# Patient Record
Sex: FEMALE | Race: WHITE | NOT HISPANIC OR LATINO | Employment: OTHER | URBAN - METROPOLITAN AREA
[De-identification: names, ages, dates, MRNs, and addresses within clinical notes are randomized per-mention and may not be internally consistent; named-entity substitution may affect disease eponyms.]

---

## 2017-03-01 ENCOUNTER — HOSPITAL ENCOUNTER (OUTPATIENT)
Dept: MAMMOGRAPHY | Facility: HOSPITAL | Age: 67
Discharge: HOME | End: 2017-03-01
Attending: OBSTETRICS & GYNECOLOGY | Admitting: OBSTETRICS & GYNECOLOGY

## 2017-03-01 ENCOUNTER — APPOINTMENT (OUTPATIENT)
Dept: BONE DENSITY | Facility: HOSPITAL | Age: 67
End: 2017-03-01
Attending: OBSTETRICS & GYNECOLOGY

## 2017-03-01 DIAGNOSIS — Z12.31 ENCOUNTER FOR MAMMOGRAM TO ESTABLISH BASELINE MAMMOGRAM: ICD-10-CM

## 2017-03-01 DIAGNOSIS — Z13.9 SCREENING: ICD-10-CM

## 2017-03-01 DIAGNOSIS — M81.0 OSTEOPOROSIS: ICD-10-CM

## 2017-03-01 PROCEDURE — G0202 SCR MAMMO BI INCL CAD: HCPCS

## 2017-03-01 PROCEDURE — 77063 BREAST TOMOSYNTHESIS BI: CPT

## 2017-03-01 PROCEDURE — 77080 DXA BONE DENSITY AXIAL: CPT

## 2017-06-15 ENCOUNTER — TELEPHONE (OUTPATIENT)
Dept: OBSTETRICS AND GYNECOLOGY | Facility: CLINIC | Age: 67
End: 2017-06-15

## 2017-06-15 NOTE — TELEPHONE ENCOUNTER
Patient called stating she needs a Rx for her Prolia, she last saw you in November 2016 it looks like.

## 2017-06-19 NOTE — TELEPHONE ENCOUNTER
I wanted her to take a break after her 4th dose of Prolia and we can recheck her DEXA in 2018 and moake sure she is stable. Is this Rx for her 4th or 5th does?

## 2018-03-09 ENCOUNTER — TRANSCRIBE ORDERS (OUTPATIENT)
Dept: ADMINISTRATIVE | Facility: HOSPITAL | Age: 68
End: 2018-03-09

## 2018-03-09 DIAGNOSIS — Z12.31 VISIT FOR SCREENING MAMMOGRAM: Primary | ICD-10-CM

## 2018-03-14 ENCOUNTER — APPOINTMENT (OUTPATIENT)
Dept: MAMMOGRAPHY | Facility: HOSPITAL | Age: 68
End: 2018-03-14
Attending: OBSTETRICS & GYNECOLOGY

## 2018-03-19 ENCOUNTER — HOSPITAL ENCOUNTER (OUTPATIENT)
Dept: MAMMOGRAPHY | Facility: HOSPITAL | Age: 68
Discharge: HOME | End: 2018-03-19
Attending: OBSTETRICS & GYNECOLOGY | Admitting: OBSTETRICS & GYNECOLOGY

## 2018-03-19 ENCOUNTER — OFFICE VISIT (OUTPATIENT)
Dept: OBSTETRICS AND GYNECOLOGY | Facility: CLINIC | Age: 68
End: 2018-03-19

## 2018-03-19 VITALS
BODY MASS INDEX: 28.04 KG/M2 | SYSTOLIC BLOOD PRESSURE: 110 MMHG | HEIGHT: 68 IN | WEIGHT: 185 LBS | DIASTOLIC BLOOD PRESSURE: 80 MMHG

## 2018-03-19 DIAGNOSIS — M81.8 OTHER OSTEOPOROSIS, UNSPECIFIED PATHOLOGICAL FRACTURE PRESENCE: ICD-10-CM

## 2018-03-19 DIAGNOSIS — Z01.411 ENCOUNTER FOR GYNECOLOGICAL EXAMINATION WITH ABNORMAL FINDING: ICD-10-CM

## 2018-03-19 DIAGNOSIS — N95.2 VAGINAL ATROPHY: ICD-10-CM

## 2018-03-19 DIAGNOSIS — Z13.9 SCREENING FOR CONDITION: Primary | ICD-10-CM

## 2018-03-19 DIAGNOSIS — Z12.31 VISIT FOR SCREENING MAMMOGRAM: ICD-10-CM

## 2018-03-19 DIAGNOSIS — Z01.419 PAP SMEAR, LOW-RISK: ICD-10-CM

## 2018-03-19 PROBLEM — K21.9 GERD (GASTROESOPHAGEAL REFLUX DISEASE): Status: ACTIVE | Noted: 2018-03-19

## 2018-03-19 PROBLEM — E78.00 HYPERCHOLESTEREMIA: Status: ACTIVE | Noted: 2018-03-19

## 2018-03-19 PROBLEM — E03.9 HYPOTHYROIDISM: Status: ACTIVE | Noted: 2018-03-19

## 2018-03-19 PROBLEM — M81.0 OSTEOPOROSIS: Status: ACTIVE | Noted: 2018-03-19

## 2018-03-19 LAB
BILIRUB BLD-MCNC: NEGATIVE MG/DL
CLARITY, POC: CLEAR
COLOR UR: YELLOW
GLUCOSE UR STRIP-MCNC: NEGATIVE MG/DL
KETONES UR QL: NEGATIVE
LEUKOCYTE EST, POC: NEGATIVE
NITRITE UR-MCNC: NEGATIVE MG/ML
PH UR: 6.5 [PH] (ref 5–8)
PROT UR STRIP-MCNC: NEGATIVE MG/DL
RBC # UR STRIP: NEGATIVE /UL
SP GR UR: 1.02 (ref 1–1.03)
UROBILINOGEN UR QL: NORMAL

## 2018-03-19 PROCEDURE — 81002 URINALYSIS NONAUTO W/O SCOPE: CPT | Performed by: OBSTETRICS & GYNECOLOGY

## 2018-03-19 PROCEDURE — 99213 OFFICE O/P EST LOW 20 MIN: CPT | Performed by: OBSTETRICS & GYNECOLOGY

## 2018-03-19 PROCEDURE — 99397 PER PM REEVAL EST PAT 65+ YR: CPT | Performed by: OBSTETRICS & GYNECOLOGY

## 2018-03-19 PROCEDURE — 77067 SCR MAMMO BI INCL CAD: CPT

## 2018-03-19 PROCEDURE — 77063 BREAST TOMOSYNTHESIS BI: CPT

## 2018-03-19 RX ORDER — ESTRADIOL 10 UG/1
1 INSERT VAGINAL 2 TIMES WEEKLY
Qty: 8 TABLET | Refills: 11 | Status: SHIPPED | OUTPATIENT
Start: 2018-03-19 | End: 2019-08-23

## 2018-03-19 NOTE — PROGRESS NOTES
GYN Annual Exam     CC- Here for annual exam.     Kerrie Polo is a 67 y.o. female established patient who presents for annual well woman exam. No  VB. She had a UTI and has noticed some vaginal dryness upon questioning.  She also had a presyncopal episode and went to the ER. Her w/u there was normal and she is seeing her primary this month. She had 4 doses of Prolia and her DEXA in 3/2017 showed osteopenia and she has been on a drug holiday. She is scheduled for a sleep study as well.       OB History      Para Term  AB Living    2 2 2   2    SAB TAB Ectopic Molar Multiple Live Births                 Obstetric Comments    2           Menarche:18  Menopause:44  HRT:none  Current contraception: post menopausal status  History of abnormal Pap smear: no  History of abnormal mammogram: no  Family history of uterine, colon or ovarian cancer: yes - dad colon cancer- ? dx age  Family history of breast cancer: yes - PGM ? age dx  STD's: none    Health Maintenance   Topic Date Due   • TDAP/TD VACCINES (1 - Tdap) 1970   • PNEUMOCOCCAL VACCINES (65+ LOW/MEDIUM RISK) (1 of 2 - PCV13) 2016   • HEPATITIS C SCREENING  06/15/2017   • MEDICARE ANNUAL WELLNESS  06/15/2017   • COLONOSCOPY  06/15/2017   • ZOSTER VACCINE  06/15/2017   • INFLUENZA VACCINE  2017   • LIPID PANEL  2018   • MAMMOGRAM  2019   • DXA SCAN  2019       Past Medical History:   Diagnosis Date   • GERD (gastroesophageal reflux disease) 3/19/2018   • Hypercholesteremia 3/19/2018   • Hypothyroidism 3/19/2018   • Osteoporosis 3/19/2018       Past Surgical History:   Procedure Laterality Date   • FOOT SURGERY           Current Outpatient Prescriptions:   •  estradiol (YUVAFEM) 10 MCG tablet vaginal tablet, Insert 1 tablet into the vagina 2 (Two) Times a Week., Disp: 8 tablet, Rfl: 11    No Known Allergies    Social History   Substance Use Topics   • Smoking status: Never Smoker   • Smokeless tobacco: Not on file  "  • Alcohol use No       Family History   Problem Relation Age of Onset   • Breast cancer Paternal Grandmother    • Colon cancer Father    • Ovarian cancer Neg Hx        Review of Systems   Constitutional: Negative for appetite change, fatigue, fever and unexpected weight change.   Respiratory: Negative for cough and shortness of breath.    Cardiovascular: Negative for chest pain and palpitations.   Gastrointestinal: Negative for abdominal distention, abdominal pain, constipation, diarrhea and nausea.   Endocrine: Negative for cold intolerance and heat intolerance.   Genitourinary: Positive for vaginal pain (dryness). Negative for dyspareunia, dysuria, genital sores, hematuria, menstrual problem, pelvic pain and vaginal discharge.   Musculoskeletal: Negative for arthralgias, back pain and myalgias.   Skin: Negative for color change and rash.   Neurological: Negative for headaches.   Psychiatric/Behavioral: Negative for dysphoric mood. The patient is not nervous/anxious.        /80   Ht 172 cm (67.72\")   Wt 83.9 kg (185 lb)   BMI 28.36 kg/m²     Physical Exam   Constitutional: She is oriented to person, place, and time. She appears well-developed and well-nourished.   HENT:   Head: Normocephalic and atraumatic.   Eyes: Conjunctivae are normal. No scleral icterus.   Neck: Neck supple. No thyromegaly present.   Cardiovascular: Normal rate, regular rhythm and normal heart sounds.    Pulmonary/Chest: Effort normal and breath sounds normal. Right breast exhibits no inverted nipple, no mass, no nipple discharge, no skin change and no tenderness. Left breast exhibits no inverted nipple, no mass, no nipple discharge, no skin change and no tenderness.   Abdominal: Soft. Bowel sounds are normal. She exhibits no distension and no mass. There is no tenderness. There is no rebound and no guarding. No hernia.   Genitourinary: Pelvic exam was performed with patient supine. There is no rash, tenderness or lesion on the " right labia. There is no rash, tenderness or lesion on the left labia. Uterus is not deviated, not enlarged, not fixed and not tender. Cervix exhibits no motion tenderness, no discharge and no friability. Right adnexum displays no mass, no tenderness and no fullness. Left adnexum displays no mass, no tenderness and no fullness. No erythema, tenderness or bleeding in the vagina. No foreign body in the vagina. No signs of injury around the vagina. No vaginal discharge found.   Genitourinary Comments: Moderate atrophy noted   Neurological: She is alert and oriented to person, place, and time.   Skin: Skin is warm and dry.   Psychiatric: She has a normal mood and affect. Her behavior is normal. Judgment and thought content normal.   Nursing note and vitals reviewed.         Assessment/Plan    1) GYN HM: check pap/HPV   SBE demonstrated and encouraged.  2) STD screening: declines Condoms encouraged.  3) Bone health - Weight bearing exercise, dietary calcium recommendations and vitamin D reviewed.   4) Diet and Exercise discussed  5) Smoking Status: non smoker  6) Vulvar atrophy with h/o UTI- Rx Vagifem. Patient counseled that vaginal estrogen rings, creams and tablets are available and highly effective at treating local vaginal symptoms such as atrophy and vaginal dryness.  Vaginal estrogen does not cause uterine overgrowth and does NOT require a progestogen to protect the uterus.  Very small amounts of estrogen are absorbed systemically.  For patients with a history of an estrogen dependent cancer such as breast cancer, the decision to use local estrogen for local vaginal symptoms should be made after consultation with her oncologist.  Possible side effects include local irritation or burning and/or vaginal bleeding and should always be reported.    7)MMG: today, results pending  8) DEXA- s/p Prolia 4 doses, pt on drug holiday. Repeat DEXA this year.   9)C scope- due 2018, refer back to Dr. Cristobal   Follow up prn and 1  year       Kerrie was seen today for gynecologic exam.    Diagnoses and all orders for this visit:    Screening for condition  -     POC Urinalysis Dipstick  -     Ambulatory Referral For Screening Colonoscopy    Pap smear, low-risk  -     Pap IG (Image Guided)    Vaginal atrophy    Other osteoporosis, unspecified pathological fracture presence  -     DEXA Bone Density Axial; Future    Encounter for gynecological examination with abnormal finding    Other orders  -     estradiol (YUVAFEM) 10 MCG tablet vaginal tablet; Insert 1 tablet into the vagina 2 (Two) Times a Week.        Magdalena Arteaga MD  3/19/2018  6:38 PM

## 2018-03-20 LAB
CYTOLOGIST CVX/VAG CYTO: NORMAL
CYTOLOGY CVX/VAG DOC THIN PREP: NORMAL
DX ICD CODE: NORMAL
HIV 1 & 2 AB SER-IMP: NORMAL
OTHER STN SPEC: NORMAL
PATH REPORT.FINAL DX SPEC: NORMAL
STAT OF ADQ CVX/VAG CYTO-IMP: NORMAL

## 2018-03-28 ENCOUNTER — TELEPHONE (OUTPATIENT)
Dept: GASTROENTEROLOGY | Facility: CLINIC | Age: 68
End: 2018-03-28

## 2018-03-28 DIAGNOSIS — Z12.11 COLON CANCER SCREENING: Primary | ICD-10-CM

## 2018-04-03 ENCOUNTER — HOSPITAL ENCOUNTER (OUTPATIENT)
Facility: HOSPITAL | Age: 68
Setting detail: HOSPITAL OUTPATIENT SURGERY
End: 2018-04-03
Attending: INTERNAL MEDICINE | Admitting: INTERNAL MEDICINE

## 2018-04-03 PROBLEM — Z12.11 COLON CANCER SCREENING: Status: ACTIVE | Noted: 2018-04-03

## 2018-04-03 NOTE — PATIENT INSTRUCTIONS
Dr. Gisele Nevarez   Date: _____8/8/18_________     Arrival Time: __8:00am_______    Hospital:  [facility] [place] ([address] [place] [address] [place] [address]) (back of hospital at the emergency room) or         Please call the office as soon as possible if you need to reschedule or cancel your procedure please give two weeks’ notice.  If you do not show up or frequently reschedule your procedure, your provider has the option of not rescheduling.    Stop the following medications 5 days prior to your procedure- check with the prescribing doctor prior to holding.  No Aspirin, Advil, Aleve, Motrin, IBU or anything listed on the back of this form.    If you are taking any medications on the attached list and/or pills that contain iron please stop them one week prior. Insulin will be addressed separately.    1.  your prescription AND a 10 oz. bottle of Magnesium Citrate (over the counter) as soon as possible. Do not wait until the day before in case of issues with cost or etc.    The day before your procedure  It is very important that you follow the instructions on this form and not on the prep you were prescribed.    You will be on a clear liquid diet only which may include coffee , tea, soft drinks, broth(chicken beef or vegetable), popsicles, Jell-O, Gatorade, juice (no orange, grapefruit or V-8).  ®No red or purple dyes and no dairy or non-dairy.    2. At 8:00 am drink the bottle of magnesium citrate.  Drink plenty of fluids in between    3. At    2:00 pm drink first dose or ½ of prep, mix as directed on container/box    Drink plenty of fluids between    4. At   10:00 pm      drink second dose or ½ of prep, mix as directed on container/box    5. If you start to get nauseous while drinking your prep try stepping away for 15-20 min. then resume again at a slower pace.    You may have clear liquids only up to 4 hours before your procedure.  No gum or candy!     You may only take your morning prescription blood  pressure (no diuretic combinations), breathing, seizure, psych or heart medications.     You will need a  to accompany you for your exam. The average time spent in our facility is around 2-3 hours.  You are not to drive, operate machinery or make legal decisions the remainder of the day following you procedure.    Please call Reyna the morning of your procedure@ [phone] if your bowel movements are not a clearish liquid or have questions about any of this information.  If it is after hours or the weekend and you need to reach the doctor or have questions for the office please call [phone].     It is your responsibility to check with your insurance company to determine benefits and out of pocket costs.      Avoid these medications 5-7 days prior to surgery  Please check with your prescribing doctor before stopping any medications  NSAIDS- Advil, Aleve, Motrin, Ibuprofen, Midol, Excedrin, Fiorinal, [place]  (Some cold medications may have these in them)    All herbal medications- iron, vitamin E, fish oil, decongestants (phenylephrine, pseudoephedrine), ginkgo, garlic, ginseng, [place]’s wart    Mobic (meloxicam), Celebrex, Diclofenac (Voltaren), Nambumetone (Relafen), Daypro, Naproxen, Sulindac, Indomethacin, Toradol, Feldine, Salsalate, Etodolac (Lodine),     Viagra, Cialis, Levitra    Aspirin, Plavix (clopidogrel), Effient, Pletal, Coumadin, Pradaxa, Brilinta, Ticlide, Eliquis, (Xaralto- 3 days)      Diet pills -Adipex (phentermine) -2 weeks prior

## 2018-06-25 ENCOUNTER — TELEPHONE (OUTPATIENT)
Dept: GASTROENTEROLOGY | Facility: CLINIC | Age: 68
End: 2018-06-25

## 2018-08-21 ENCOUNTER — PREP FOR SURGERY (OUTPATIENT)
Dept: OTHER | Facility: HOSPITAL | Age: 68
End: 2018-08-21

## 2018-08-21 ENCOUNTER — ANESTHESIA EVENT (OUTPATIENT)
Dept: PERIOP | Facility: HOSPITAL | Age: 68
End: 2018-08-21

## 2018-08-21 RX ORDER — SODIUM CHLORIDE 9 MG/ML
40 INJECTION, SOLUTION INTRAVENOUS AS NEEDED
Status: CANCELLED | OUTPATIENT
Start: 2018-08-21

## 2018-08-21 RX ORDER — SODIUM CHLORIDE 0.9 % (FLUSH) 0.9 %
1-10 SYRINGE (ML) INJECTION AS NEEDED
Status: CANCELLED | OUTPATIENT
Start: 2018-08-21

## 2018-08-21 RX ORDER — SODIUM CHLORIDE, SODIUM LACTATE, POTASSIUM CHLORIDE, CALCIUM CHLORIDE 600; 310; 30; 20 MG/100ML; MG/100ML; MG/100ML; MG/100ML
9 INJECTION, SOLUTION INTRAVENOUS CONTINUOUS
Status: CANCELLED | OUTPATIENT
Start: 2018-08-21

## 2018-08-21 RX ORDER — LIDOCAINE HYDROCHLORIDE 10 MG/ML
0.5 INJECTION, SOLUTION EPIDURAL; INFILTRATION; INTRACAUDAL; PERINEURAL ONCE AS NEEDED
Status: CANCELLED | OUTPATIENT
Start: 2018-08-21

## 2018-08-22 ENCOUNTER — ANESTHESIA (OUTPATIENT)
Dept: PERIOP | Facility: HOSPITAL | Age: 68
End: 2018-08-22

## 2018-08-22 ENCOUNTER — TELEPHONE (OUTPATIENT)
Dept: GASTROENTEROLOGY | Facility: CLINIC | Age: 68
End: 2018-08-22

## 2019-02-13 ENCOUNTER — TRANSCRIBE ORDERS (OUTPATIENT)
Dept: OBSTETRICS AND GYNECOLOGY | Facility: CLINIC | Age: 69
End: 2019-02-13

## 2019-02-13 DIAGNOSIS — Z12.39 SCREENING BREAST EXAMINATION: Primary | ICD-10-CM

## 2019-04-22 ENCOUNTER — HOSPITAL ENCOUNTER (OUTPATIENT)
Dept: MAMMOGRAPHY | Facility: HOSPITAL | Age: 69
Discharge: HOME | End: 2019-04-22
Admitting: OBSTETRICS & GYNECOLOGY

## 2019-04-22 DIAGNOSIS — Z12.39 SCREENING BREAST EXAMINATION: ICD-10-CM

## 2019-04-22 PROCEDURE — 77063 BREAST TOMOSYNTHESIS BI: CPT

## 2019-04-22 PROCEDURE — 77067 SCR MAMMO BI INCL CAD: CPT

## 2019-08-22 ENCOUNTER — OFFICE VISIT (OUTPATIENT)
Dept: OBSTETRICS AND GYNECOLOGY | Facility: CLINIC | Age: 69
End: 2019-08-22

## 2019-08-22 VITALS
HEIGHT: 68 IN | DIASTOLIC BLOOD PRESSURE: 70 MMHG | BODY MASS INDEX: 27.1 KG/M2 | WEIGHT: 178.8 LBS | SYSTOLIC BLOOD PRESSURE: 112 MMHG

## 2019-08-22 DIAGNOSIS — Z13.9 SCREENING FOR CONDITION: Primary | ICD-10-CM

## 2019-08-22 DIAGNOSIS — N95.2 VAGINAL ATROPHY: ICD-10-CM

## 2019-08-22 DIAGNOSIS — M81.8 OTHER OSTEOPOROSIS, UNSPECIFIED PATHOLOGICAL FRACTURE PRESENCE: ICD-10-CM

## 2019-08-22 DIAGNOSIS — Z01.411 ENCOUNTER FOR GYNECOLOGICAL EXAMINATION WITH ABNORMAL FINDING: ICD-10-CM

## 2019-08-22 LAB
BILIRUB BLD-MCNC: NEGATIVE MG/DL
CLARITY, POC: CLEAR
COLOR UR: YELLOW
GLUCOSE UR STRIP-MCNC: NEGATIVE MG/DL
KETONES UR QL: NEGATIVE
LEUKOCYTE EST, POC: NEGATIVE
NITRITE UR-MCNC: NEGATIVE MG/ML
PH UR: 5 [PH] (ref 5–8)
PROT UR STRIP-MCNC: NEGATIVE MG/DL
RBC # UR STRIP: NEGATIVE /UL
SP GR UR: 1 (ref 1–1.03)
UROBILINOGEN UR QL: NORMAL

## 2019-08-22 PROCEDURE — 81002 URINALYSIS NONAUTO W/O SCOPE: CPT | Performed by: OBSTETRICS & GYNECOLOGY

## 2019-08-22 PROCEDURE — 99397 PER PM REEVAL EST PAT 65+ YR: CPT | Performed by: OBSTETRICS & GYNECOLOGY

## 2019-08-22 RX ORDER — ALBUTEROL SULFATE 2.5 MG/3ML
2.5 SOLUTION RESPIRATORY (INHALATION)
COMMUNITY

## 2019-08-22 RX ORDER — MONTELUKAST SODIUM 10 MG/1
10 TABLET ORAL
COMMUNITY

## 2019-08-22 RX ORDER — FLUTICASONE PROPIONATE 50 MCG
1 SPRAY, SUSPENSION (ML) NASAL
COMMUNITY

## 2019-08-22 RX ORDER — LEVOTHYROXINE SODIUM 0.1 MG/1
75 TABLET ORAL DAILY
COMMUNITY
End: 2020-12-28

## 2019-08-22 RX ORDER — RANITIDINE 150 MG/1
150 CAPSULE ORAL
COMMUNITY
End: 2020-11-24

## 2019-08-22 RX ORDER — NITROGLYCERIN 0.4 MG/1
0.4 TABLET SUBLINGUAL
COMMUNITY
Start: 2018-08-18

## 2019-08-22 RX ORDER — ATORVASTATIN CALCIUM 40 MG/1
40 TABLET, FILM COATED ORAL DAILY
COMMUNITY
Start: 2018-08-01 | End: 2019-08-22

## 2019-08-22 RX ORDER — ASPIRIN 81 MG/1
81 TABLET, CHEWABLE ORAL DAILY
COMMUNITY
Start: 2018-08-01

## 2019-08-22 RX ORDER — TICAGRELOR 90 MG/1
TABLET ORAL
COMMUNITY
Start: 2019-08-07 | End: 2020-11-24

## 2019-08-22 RX ORDER — ATORVASTATIN CALCIUM 40 MG/1
TABLET, FILM COATED ORAL
COMMUNITY
Start: 2019-08-03 | End: 2020-12-28

## 2019-08-22 NOTE — PROGRESS NOTES
GYN Annual Exam     CC- Here for annual exam.     Kerrie Polo is a 68 y.o. female established patient who presents for annual well woman exam. NO  VB. She had a MI in 2018. She did not use any vaginal E2 cream as it was too expensive. She is interested in using another method if it is affordable. We discussed that vaginal E2 will help with recurrent UTIs.       OB History      Para Term  AB Living    2 2 2     2    SAB TAB Ectopic Molar Multiple Live Births                       Obstetric Comments    2           Menarche:18  Menopause:44  HRT:none  Current contraception: post menopausal status  History of abnormal Pap smear: no  History of abnormal mammogram: no  Family history of uterine, colon or ovarian cancer: yes - dad colon cancer > 50  Family history of breast cancer: yes - PGM ? age dx  STD's: none  Last pap smear:3/2018- nl pap  Gardasil: none  RAYSHAWN: family history and mom wiht DVT/PE, sister with DVT, MGM with DVT /PE      Health Maintenance   Topic Date Due   • TDAP/TD VACCINES (1 - Tdap) 1970   • ZOSTER VACCINE (1 of 2) 2001   • PNEUMOCOCCAL VACCINES (65+ LOW/MEDIUM RISK) (1 of 2 - PCV13) 2016   • HEPATITIS C SCREENING  06/15/2017   • MEDICARE ANNUAL WELLNESS  06/15/2017   • COLONOSCOPY  06/15/2017   • DXA SCAN  2019   • LIPID PANEL  2019   • INFLUENZA VACCINE  2019   • MAMMOGRAM  2021       Past Medical History:   Diagnosis Date   • CAD (coronary artery disease)    • GERD (gastroesophageal reflux disease) 3/19/2018   • Hypercholesteremia 3/19/2018   • Hypothyroidism 3/19/2018   • MICHAEL (obstructive sleep apnea) 2019   • Osteoporosis 3/19/2018       Past Surgical History:   Procedure Laterality Date   • FOOT SURGERY           Current Outpatient Medications:   •  aspirin 81 MG chewable tablet, Chew 81 mg Daily., Disp: , Rfl:   •  nitroglycerin (NITROSTAT) 0.4 MG SL tablet, Place 0.4 mg under the tongue., Disp: , Rfl:   •  albuterol  (PROVENTIL) (2.5 MG/3ML) 0.083% nebulizer solution, Inhale 2.5 mg., Disp: , Rfl:   •  atorvastatin (LIPITOR) 40 MG tablet, , Disp: , Rfl:   •  BRILINTA 90 MG tablet tablet, , Disp: , Rfl:   •  [START ON 8/26/2019] Estradiol (IMVEXXY MAINTENANCE PACK) 4 MCG insert, Insert 1 capsule into the vagina 2 (Two) Times a Week., Disp: 8 each, Rfl: 11  •  fluticasone (FLONASE) 50 MCG/ACT nasal spray, 1 spray into the nostril(s) as directed by provider., Disp: , Rfl:   •  levothyroxine (SYNTHROID, LEVOTHROID) 100 MCG tablet, Take 75 mcg by mouth Daily., Disp: , Rfl:   •  montelukast (SINGULAIR) 10 MG tablet, Take 10 mg by mouth., Disp: , Rfl:   •  polyethylene glycol (GoLYTELY) 236 g solution, Drink 1/2 starting at 2:00pm the day prior. Drink remaining 1/2 at 10:00 pm. May add flavor packet., Disp: 4000 mL, Rfl: 0  •  ranitidine (ZANTAC) 150 MG capsule, Take 150 mg by mouth., Disp: , Rfl:     Allergies   Allergen Reactions   • Codeine Nausea And Vomiting       Social History     Tobacco Use   • Smoking status: Never Smoker   Substance Use Topics   • Alcohol use: No   • Drug use: No       Family History   Problem Relation Age of Onset   • Breast cancer Paternal Grandmother         ? age at dx   • Colon cancer Father         ? age   • Deep vein thrombosis Mother    • Pulmonary embolism Mother    • Deep vein thrombosis Sister    • Deep vein thrombosis Maternal Grandmother    • Ovarian cancer Neg Hx        Review of Systems   Constitutional: Positive for unexpected weight change (intentional). Negative for appetite change, fatigue and fever.   Eyes: Negative for photophobia and visual disturbance.   Respiratory: Negative for cough and shortness of breath.    Cardiovascular: Negative for chest pain and palpitations.   Gastrointestinal: Negative for abdominal distention, abdominal pain, constipation, diarrhea and nausea.   Endocrine: Negative for cold intolerance and heat intolerance.   Genitourinary: Positive for vaginal pain  "(dryness). Negative for dyspareunia, dysuria, menstrual problem, pelvic pain and vaginal discharge.   Musculoskeletal: Negative for back pain.   Skin: Negative for color change and rash.   Neurological: Negative for headaches.   Hematological: Negative for adenopathy. Does not bruise/bleed easily.   Psychiatric/Behavioral: Positive for sleep disturbance. Negative for dysphoric mood. The patient is not nervous/anxious.        /70   Ht 172 cm (67.72\")   Wt 81.1 kg (178 lb 12.8 oz)   Breastfeeding? No   BMI 27.41 kg/m²     Physical Exam   Constitutional: She is oriented to person, place, and time. She appears well-developed and well-nourished.   HENT:   Head: Normocephalic and atraumatic.   Eyes: Conjunctivae are normal. No scleral icterus.   Neck: Neck supple. No thyromegaly present.   Cardiovascular: Normal rate, regular rhythm and normal heart sounds.   Pulmonary/Chest: Effort normal and breath sounds normal. Right breast exhibits no inverted nipple, no mass, no nipple discharge, no skin change and no tenderness. Left breast exhibits no inverted nipple, no mass, no nipple discharge, no skin change and no tenderness.   Abdominal: Soft. Bowel sounds are normal. She exhibits no distension and no mass. There is no tenderness. There is no rebound and no guarding. No hernia.   Genitourinary: Pelvic exam was performed with patient supine. There is no rash, tenderness or lesion on the right labia. There is no rash, tenderness or lesion on the left labia. Uterus is not deviated, not enlarged, not fixed and not tender. Cervix exhibits no motion tenderness, no discharge and no friability. Right adnexum displays no mass, no tenderness and no fullness. Left adnexum displays no mass, no tenderness and no fullness. No erythema, tenderness or bleeding in the vagina. No foreign body in the vagina. No signs of injury around the vagina. No vaginal discharge found.   Genitourinary Comments: Moderate atrophy noted "   Neurological: She is alert and oriented to person, place, and time.   Skin: Skin is warm and dry.   Psychiatric: She has a normal mood and affect. Her behavior is normal. Judgment and thought content normal.   Nursing note and vitals reviewed.         Assessment/Plan    1) GYN HM: UTD 3/2018 nl pap  SBE demonstrated and encouraged.  2) STD screening: declines Condoms encouraged.  3) Bone health - Weight bearing exercise, dietary calcium recommendations and vitamin D reviewed.   4) Diet and Exercise discussed  5) Smoking Status: No  6) Vaginal atrophy- Patient counseled that vaginal estrogen rings, creams and tablets are available and highly effective at treating local vaginal symptoms such as atrophy and vaginal dryness.  Vaginal estrogen does not cause uterine overgrowth and does not require a progestogen to protect the uterus.  Very small amounts of estrogen are absorbed systemically.  For patients with a history of an estrogen dependent cancer such as breast cancer, the decision to use local estrogen for local vaginal symptoms should be made after consultation with her oncologist.  Possible side effects include local irritation or burning and/or vaginal bleeding and should always be reported.  Rx Imvecty 4 mcg x2/week  7)MMG: UTD 4/2019- B1  8) DEXA-due, will schedule. Had 4 doses of Prolia and is now on drug holiday.  9)C scope-due, plans to have it in the fall.   10) Family h/o DVT/PE- pt says she does not have any hereditary clotting disorder.    Follow up prn and 1 year       Kerrie was seen today for gynecologic exam.    Diagnoses and all orders for this visit:    Screening for condition  -     POC Urinalysis Dipstick    Vaginal atrophy    Encounter for gynecological examination with abnormal finding    Other osteoporosis, unspecified pathological fracture presence    Other orders  -     Estradiol (IMVEXXY MAINTENANCE PACK) 4 MCG insert; Insert 1 capsule into the vagina 2 (Two) Times a Week.        Magdalena  Leyda Arteaga MD  8/22/19  7:50 AM

## 2019-08-23 PROBLEM — I21.9 MI (MYOCARDIAL INFARCTION) (HCC): Status: ACTIVE | Noted: 2019-08-23

## 2019-08-23 PROBLEM — G47.33 OSA (OBSTRUCTIVE SLEEP APNEA): Status: ACTIVE | Noted: 2019-08-23

## 2019-09-04 ENCOUNTER — TRANSCRIBE ORDERS (OUTPATIENT)
Dept: ADMINISTRATIVE | Facility: HOSPITAL | Age: 69
End: 2019-09-04

## 2019-09-30 ENCOUNTER — TELEPHONE (OUTPATIENT)
Dept: OBSTETRICS AND GYNECOLOGY | Facility: CLINIC | Age: 69
End: 2019-09-30

## 2019-10-01 ENCOUNTER — APPOINTMENT (OUTPATIENT)
Dept: BONE DENSITY | Facility: HOSPITAL | Age: 69
End: 2019-10-01

## 2019-10-01 DIAGNOSIS — M85.80 OSTEOPENIA, UNSPECIFIED LOCATION: Primary | ICD-10-CM

## 2019-10-01 DIAGNOSIS — M85.9 DISORDER OF BONE DENSITY AND STRUCTURE, UNSPECIFIED: ICD-10-CM

## 2019-10-08 ENCOUNTER — APPOINTMENT (OUTPATIENT)
Dept: BONE DENSITY | Facility: HOSPITAL | Age: 69
End: 2019-10-08

## 2019-10-08 DIAGNOSIS — M81.8 OTHER OSTEOPOROSIS, UNSPECIFIED PATHOLOGICAL FRACTURE PRESENCE: ICD-10-CM

## 2019-10-08 DIAGNOSIS — Z13.9 SCREENING FOR CONDITION: ICD-10-CM

## 2019-10-08 PROCEDURE — 77080 DXA BONE DENSITY AXIAL: CPT

## 2019-10-08 NOTE — PROGRESS NOTES
PIP= bone mineral density shows recurrent osteoporosis, patient needs to restart Prolia.  Prescription sent in.

## 2019-10-15 NOTE — PROGRESS NOTES
PIP= there are oral medication to use but they can make GERD worse, so that may not be a good choice for her. There are other infusion type medications but I do not prescribe them. She can also call her primary care doctor for an opinion as well. Just let me know what she wants to do. SKY

## 2019-12-05 ENCOUNTER — TELEPHONE (OUTPATIENT)
Dept: OBSTETRICS AND GYNECOLOGY | Facility: CLINIC | Age: 69
End: 2019-12-05

## 2020-02-14 ENCOUNTER — TRANSCRIBE ORDERS (OUTPATIENT)
Dept: ADMINISTRATIVE | Facility: HOSPITAL | Age: 70
End: 2020-02-14

## 2020-02-14 DIAGNOSIS — Z12.31 VISIT FOR SCREENING MAMMOGRAM: Primary | ICD-10-CM

## 2020-04-27 ENCOUNTER — APPOINTMENT (OUTPATIENT)
Dept: MAMMOGRAPHY | Facility: HOSPITAL | Age: 70
End: 2020-04-27

## 2020-07-01 ENCOUNTER — PREP FOR SURGERY (OUTPATIENT)
Dept: OTHER | Facility: HOSPITAL | Age: 70
End: 2020-07-01

## 2020-07-01 ENCOUNTER — TELEPHONE (OUTPATIENT)
Dept: GASTROENTEROLOGY | Facility: CLINIC | Age: 70
End: 2020-07-01

## 2020-07-01 DIAGNOSIS — Z12.11 ENCOUNTER FOR SCREENING FOR MALIGNANT NEOPLASM OF COLON: Primary | ICD-10-CM

## 2020-07-01 NOTE — TELEPHONE ENCOUNTER
FAST TRACK (IN MEDIA) - 5 YEAR RECALL - LAST COLONOSCOPY 08/14/2015, DR. THOMAS - PERSONAL HISTORY - SCHEDULE AT [place].

## 2020-08-17 ENCOUNTER — HOSPITAL ENCOUNTER (OUTPATIENT)
Dept: MAMMOGRAPHY | Facility: HOSPITAL | Age: 70
Discharge: HOME | End: 2020-08-17
Admitting: OBSTETRICS & GYNECOLOGY

## 2020-08-17 DIAGNOSIS — Z12.31 VISIT FOR SCREENING MAMMOGRAM: ICD-10-CM

## 2020-08-17 PROCEDURE — 77067 SCR MAMMO BI INCL CAD: CPT

## 2020-08-17 PROCEDURE — 77063 BREAST TOMOSYNTHESIS BI: CPT

## 2020-10-20 ENCOUNTER — PREP FOR SURGERY (OUTPATIENT)
Dept: OTHER | Facility: HOSPITAL | Age: 70
End: 2020-10-20

## 2020-10-20 DIAGNOSIS — Z12.11 ENCOUNTER FOR SCREENING FOR MALIGNANT NEOPLASM OF COLON: Primary | ICD-10-CM

## 2020-11-17 ENCOUNTER — TRANSCRIBE ORDERS (OUTPATIENT)
Dept: ADMINISTRATIVE | Facility: HOSPITAL | Age: 70
End: 2020-11-17

## 2020-11-17 DIAGNOSIS — U07.1 COVID-19: Primary | ICD-10-CM

## 2020-11-23 ENCOUNTER — LAB (OUTPATIENT)
Dept: LAB | Facility: HOSPITAL | Age: 70
End: 2020-11-23

## 2020-11-23 ENCOUNTER — TELEPHONE (OUTPATIENT)
Dept: GASTROENTEROLOGY | Facility: CLINIC | Age: 70
End: 2020-11-23

## 2020-11-23 DIAGNOSIS — U07.1 COVID-19: ICD-10-CM

## 2020-11-23 PROCEDURE — C9803 HOPD COVID-19 SPEC COLLECT: HCPCS

## 2020-11-23 PROCEDURE — U0004 COV-19 TEST NON-CDC HGH THRU: HCPCS | Performed by: OBSTETRICS & GYNECOLOGY

## 2020-11-24 ENCOUNTER — ANESTHESIA EVENT (OUTPATIENT)
Dept: PERIOP | Facility: HOSPITAL | Age: 70
End: 2020-11-24

## 2020-11-24 LAB — SARS-COV-2 RNA RESP QL NAA+PROBE: NOT DETECTED

## 2020-11-24 RX ORDER — OMEPRAZOLE 20 MG/1
20 CAPSULE, DELAYED RELEASE ORAL DAILY
COMMUNITY

## 2020-11-25 ENCOUNTER — HOSPITAL ENCOUNTER (OUTPATIENT)
Facility: HOSPITAL | Age: 70
Setting detail: HOSPITAL OUTPATIENT SURGERY
Discharge: HOME | End: 2020-11-25
Attending: INTERNAL MEDICINE | Admitting: INTERNAL MEDICINE

## 2020-11-25 ENCOUNTER — ANESTHESIA (OUTPATIENT)
Dept: PERIOP | Facility: HOSPITAL | Age: 70
End: 2020-11-25

## 2020-11-25 VITALS
HEIGHT: 68 IN | RESPIRATION RATE: 15 BRPM | SYSTOLIC BLOOD PRESSURE: 132 MMHG | DIASTOLIC BLOOD PRESSURE: 67 MMHG | BODY MASS INDEX: 28.67 KG/M2 | TEMPERATURE: 97.3 F | HEART RATE: 60 BPM | OXYGEN SATURATION: 94 % | WEIGHT: 189.2 LBS

## 2020-11-25 DIAGNOSIS — Z12.11 ENCOUNTER FOR SCREENING FOR MALIGNANT NEOPLASM OF COLON: ICD-10-CM

## 2020-11-25 PROCEDURE — 25010000002 PROPOFOL 10 MG/ML EMULSION: Performed by: NURSE ANESTHETIST, CERTIFIED REGISTERED

## 2020-11-25 PROCEDURE — 88305 TISSUE EXAM BY PATHOLOGIST: CPT | Performed by: INTERNAL MEDICINE

## 2020-11-25 PROCEDURE — 45380 COLONOSCOPY AND BIOPSY: CPT | Performed by: INTERNAL MEDICINE

## 2020-11-25 RX ORDER — PROPOFOL 10 MG/ML
VIAL (ML) INTRAVENOUS AS NEEDED
Status: DISCONTINUED | OUTPATIENT
Start: 2020-11-25 | End: 2020-11-25 | Stop reason: SURG

## 2020-11-25 RX ORDER — SODIUM CHLORIDE 0.9 % (FLUSH) 0.9 %
10 SYRINGE (ML) INJECTION AS NEEDED
Status: DISCONTINUED | OUTPATIENT
Start: 2020-11-25 | End: 2020-11-25 | Stop reason: HOSPADM

## 2020-11-25 RX ORDER — LIDOCAINE HYDROCHLORIDE 10 MG/ML
0.5 INJECTION, SOLUTION EPIDURAL; INFILTRATION; INTRACAUDAL; PERINEURAL ONCE AS NEEDED
Status: DISCONTINUED | OUTPATIENT
Start: 2020-11-25 | End: 2020-11-25 | Stop reason: HOSPADM

## 2020-11-25 RX ORDER — SODIUM CHLORIDE 0.9 % (FLUSH) 0.9 %
10 SYRINGE (ML) INJECTION EVERY 12 HOURS SCHEDULED
Status: DISCONTINUED | OUTPATIENT
Start: 2020-11-25 | End: 2020-11-25 | Stop reason: HOSPADM

## 2020-11-25 RX ORDER — LIDOCAINE HYDROCHLORIDE 20 MG/ML
INJECTION, SOLUTION INFILTRATION; PERINEURAL AS NEEDED
Status: DISCONTINUED | OUTPATIENT
Start: 2020-11-25 | End: 2020-11-25 | Stop reason: SURG

## 2020-11-25 RX ORDER — SODIUM CHLORIDE 9 MG/ML
40 INJECTION, SOLUTION INTRAVENOUS AS NEEDED
Status: DISCONTINUED | OUTPATIENT
Start: 2020-11-25 | End: 2020-11-25 | Stop reason: HOSPADM

## 2020-11-25 RX ORDER — SODIUM CHLORIDE, SODIUM LACTATE, POTASSIUM CHLORIDE, CALCIUM CHLORIDE 600; 310; 30; 20 MG/100ML; MG/100ML; MG/100ML; MG/100ML
9 INJECTION, SOLUTION INTRAVENOUS CONTINUOUS
Status: DISCONTINUED | OUTPATIENT
Start: 2020-11-25 | End: 2020-11-25 | Stop reason: HOSPADM

## 2020-11-25 RX ADMIN — PROPOFOL 50 MG: 10 INJECTION, EMULSION INTRAVENOUS at 15:06

## 2020-11-25 RX ADMIN — SODIUM CHLORIDE, POTASSIUM CHLORIDE, SODIUM LACTATE AND CALCIUM CHLORIDE: 600; 310; 30; 20 INJECTION, SOLUTION INTRAVENOUS at 14:52

## 2020-11-25 RX ADMIN — PROPOFOL 50 MG: 10 INJECTION, EMULSION INTRAVENOUS at 15:10

## 2020-11-25 RX ADMIN — PROPOFOL 30 MG: 10 INJECTION, EMULSION INTRAVENOUS at 15:26

## 2020-11-25 RX ADMIN — PROPOFOL 50 MG: 10 INJECTION, EMULSION INTRAVENOUS at 15:03

## 2020-11-25 RX ADMIN — PROPOFOL 50 MG: 10 INJECTION, EMULSION INTRAVENOUS at 15:15

## 2020-11-25 RX ADMIN — LIDOCAINE HYDROCHLORIDE 100 MG: 20 INJECTION, SOLUTION INFILTRATION; PERINEURAL at 15:03

## 2020-11-25 NOTE — ANESTHESIA POSTPROCEDURE EVALUATION
Patient: Kerrie Polo    Procedure Summary     Date: 11/25/20 Room / Location: [facility] ENDOSCOPY 1 /  [facility]    Anesthesia Start: 1459 Anesthesia Stop: 1530    Procedure: COLONOSCOPY; POLYPECTOMY (N/A ) Diagnosis:       Encounter for screening for malignant neoplasm of colon      Diverticulosis large intestine w/o perforation or abscess w/o bleeding      Colon polyp      (Encounter for screening for malignant neoplasm of colon [Z12.11])    Surgeon: Ravinder Mccloud MD Provider: Richard Villalpando CRNA    Anesthesia Type: MAC ASA Status: 2          Anesthesia Type: MAC    Vitals  Vitals Value Taken Time   BP 95/50 11/25/20 1537   Temp 97.3 °F (36.3 °C) 11/25/20 1537   Pulse 64 11/25/20 1537   Resp 18 11/25/20 1537   SpO2 96 % 11/25/20 1537           Post Anesthesia Care and Evaluation    Patient location during evaluation: bedside  Patient participation: complete - patient participated  Level of consciousness: awake and alert  Pain score: 0  Pain management: adequate  Airway patency: patent  Anesthetic complications: No anesthetic complications  PONV Status: none  Cardiovascular status: acceptable  Respiratory status: acceptable  Hydration status: acceptable  No anesthesia care post op

## 2020-11-25 NOTE — ANESTHESIA PREPROCEDURE EVALUATION
Anesthesia Evaluation     Patient summary reviewed and Nursing notes reviewed   no history of anesthetic complications:  NPO Solid Status: > 8 hours  NPO Liquid Status: > 8 hours           Airway   Mallampati: II  TM distance: <3 FB  Neck ROM: full  No difficulty expected  Dental - normal exam     Pulmonary - normal exam   (+) asthma (allergies - requiring inhalers),sleep apnea on CPAP,   Cardiovascular - normal exam  Exercise tolerance: good (4-7 METS)    ECG reviewed    (+) past MI  >12 months, CAD, hyperlipidemia,       Neuro/Psych- negative ROS  GI/Hepatic/Renal/Endo    (+)  GERD,  thyroid problem hypothyroidism    Musculoskeletal (-) negative ROS    Abdominal  - normal exam   Substance History   (+) alcohol use,      OB/GYN negative ob/gyn ROS         Other                        Anesthesia Plan    ASA 2     MAC     intravenous induction     Anesthetic plan, all risks, benefits, and alternatives have been provided, discussed and informed consent has been obtained with: patient.  Use of blood products discussed with patient  Consented to blood products.

## 2020-11-30 LAB
CYTO UR: NORMAL
LAB AP CASE REPORT: NORMAL
PATH REPORT.FINAL DX SPEC: NORMAL
PATH REPORT.GROSS SPEC: NORMAL

## 2020-12-28 ENCOUNTER — OFFICE VISIT (OUTPATIENT)
Dept: OBSTETRICS AND GYNECOLOGY | Facility: CLINIC | Age: 70
End: 2020-12-28

## 2020-12-28 VITALS
DIASTOLIC BLOOD PRESSURE: 72 MMHG | SYSTOLIC BLOOD PRESSURE: 124 MMHG | HEIGHT: 68 IN | WEIGHT: 192 LBS | BODY MASS INDEX: 29.1 KG/M2

## 2020-12-28 DIAGNOSIS — N95.2 VAGINAL ATROPHY: ICD-10-CM

## 2020-12-28 DIAGNOSIS — Z78.0 ASYMPTOMATIC MENOPAUSAL STATE: ICD-10-CM

## 2020-12-28 DIAGNOSIS — Z01.419 PAP SMEAR, LOW-RISK: Primary | ICD-10-CM

## 2020-12-28 DIAGNOSIS — M81.8 OTHER OSTEOPOROSIS WITHOUT CURRENT PATHOLOGICAL FRACTURE: ICD-10-CM

## 2020-12-28 DIAGNOSIS — Z13.9 SCREENING FOR CONDITION: ICD-10-CM

## 2020-12-28 DIAGNOSIS — Z11.51 SPECIAL SCREENING EXAMINATION FOR HUMAN PAPILLOMAVIRUS (HPV): ICD-10-CM

## 2020-12-28 DIAGNOSIS — Z12.31 ENCOUNTER FOR SCREENING MAMMOGRAM FOR MALIGNANT NEOPLASM OF BREAST: ICD-10-CM

## 2020-12-28 DIAGNOSIS — Z01.419 ROUTINE GYNECOLOGICAL EXAMINATION: ICD-10-CM

## 2020-12-28 PROBLEM — Z12.11 ENCOUNTER FOR SCREENING FOR MALIGNANT NEOPLASM OF COLON: Status: RESOLVED | Noted: 2020-10-20 | Resolved: 2020-12-28

## 2020-12-28 PROBLEM — Z12.11 COLON CANCER SCREENING: Status: RESOLVED | Noted: 2018-04-03 | Resolved: 2020-12-28

## 2020-12-28 PROCEDURE — G0101 CA SCREEN;PELVIC/BREAST EXAM: HCPCS | Performed by: OBSTETRICS & GYNECOLOGY

## 2020-12-28 PROCEDURE — 81002 URINALYSIS NONAUTO W/O SCOPE: CPT | Performed by: OBSTETRICS & GYNECOLOGY

## 2020-12-28 RX ORDER — ATORVASTATIN CALCIUM 20 MG/1
TABLET, FILM COATED ORAL
COMMUNITY
Start: 2020-12-10

## 2020-12-28 RX ORDER — INFLUENZA A VIRUS A/MICHIGAN/45/2015 X-275 (H1N1) ANTIGEN (FORMALDEHYDE INACTIVATED), INFLUENZA A VIRUS A/SINGAPORE/INFIMH-16-0019/2016 IVR-186 (H3N2) ANTIGEN (FORMALDEHYDE INACTIVATED), INFLUENZA B VIRUS B/PHUKET/3073/2013 ANTIGEN (FORMALDEHYDE INACTIVATED), AND INFLUENZA B VIRUS B/MARYLAND/15/2016 BX-69A ANTIGEN (FORMALDEHYDE INACTIVATED) 60; 60; 60; 60 UG/.7ML; UG/.7ML; UG/.7ML; UG/.7ML
INJECTION, SUSPENSION INTRAMUSCULAR
COMMUNITY
Start: 2020-11-04

## 2020-12-28 RX ORDER — LEVOTHYROXINE SODIUM 0.07 MG/1
TABLET ORAL
COMMUNITY
Start: 2020-12-10

## 2020-12-28 NOTE — PROGRESS NOTES
GYN Annual Exam     CC- Here for annual exam.     Kerrie Polo is a 70 y.o. female established patient who presents for annual well woman exam. NO  VB. She had a MI in 2018. She did not use any vaginal E2 cream as she said it was not longer needed. She has ot taken any Prolia yet and has it scheduled for 2021. She retired this year and is happy.     OB History        2    Para   2    Term   2            AB        Living   2       SAB        TAB        Ectopic        Molar        Multiple        Live Births              Obstetric Comments   2              Menarche:18  Menopause:44  HRT:none  Current contraception: post menopausal status  History of abnormal Pap smear: no  History of abnormal mammogram: no  Family history of uterine, colon or ovarian cancer: yes - dad colon cancer > 50  Family history of breast cancer: yes - PGM ? age dx  STD's: none  Last pap smear:3/2018- nl pap  Gardasil: none  RAYSHAWN: family history and mom wiht DVT/PE, sister with DVT, MGM with DVT /PE- denies any clotting d/o history      Health Maintenance   Topic Date Due   • ZOSTER VACCINE (1 of 2) 2001   • Pneumococcal Vaccine 65+ (1 of 1 - PPSV23) 2016   • HEPATITIS C SCREENING  06/15/2017   • ANNUAL WELLNESS VISIT  06/15/2017   • LIPID PANEL  2019   • INFLUENZA VACCINE  2020   • DXA SCAN  10/08/2021   • MAMMOGRAM  2022   • COLONOSCOPY  2023   • TDAP/TD VACCINES (2 - Td) 10/05/2029       Past Medical History:   Diagnosis Date   • CAD (coronary artery disease)    • GERD (gastroesophageal reflux disease) 3/19/2018   • Hypercholesteremia 3/19/2018   • Hypothyroidism 3/19/2018   • MI (mitral incompetence)    • MICHAEL (obstructive sleep apnea) 2019   • Osteoporosis 3/19/2018       Past Surgical History:   Procedure Laterality Date   • CARDIAC CATHETERIZATION     • COLONOSCOPY     • COLONOSCOPY N/A 2020    Procedure: COLONOSCOPY; POLYPECTOMY;  Surgeon: Ravinder Mccloud,  MD;  Location: [facility];  Service: Gastroenterology;  Laterality: N/A;  POLYPS  DIVERTICULOSIS   • FOOT SURGERY           Current Outpatient Medications:   •  albuterol (PROVENTIL) (2.5 MG/3ML) 0.083% nebulizer solution, Inhale 2.5 mg., Disp: , Rfl:   •  aspirin 81 MG chewable tablet, Chew 81 mg Daily., Disp: , Rfl:   •  atorvastatin (LIPITOR) 20 MG tablet, , Disp: , Rfl:   •  fluticasone (FLONASE) 50 MCG/ACT nasal spray, 1 spray into the nostril(s) as directed by provider., Disp: , Rfl:   •  Fluzone High-Dose Quadrivalent 0.7 ML suspension prefilled syringe, , Disp: , Rfl:   •  levothyroxine (SYNTHROID, LEVOTHROID) 75 MCG tablet, , Disp: , Rfl:   •  montelukast (SINGULAIR) 10 MG tablet, Take 10 mg by mouth., Disp: , Rfl:   •  nitroglycerin (NITROSTAT) 0.4 MG SL tablet, Place 0.4 mg under the tongue., Disp: , Rfl:   •  omeprazole (priLOSEC) 20 MG capsule, Take 20 mg by mouth Daily., Disp: , Rfl:     Allergies   Allergen Reactions   • Codeine Nausea And Vomiting       Social History     Tobacco Use   • Smoking status: Never Smoker   • Smokeless tobacco: Never Used   Substance Use Topics   • Alcohol use: Yes     Comment: RARE   • Drug use: No       Family History   Problem Relation Age of Onset   • Breast cancer Paternal Grandmother         ? age at dx   • Colon cancer Father         ? age   • Deep vein thrombosis Mother    • Pulmonary embolism Mother    • Deep vein thrombosis Sister    • Deep vein thrombosis Maternal Grandmother    • Ovarian cancer Neg Hx        Review of Systems   Constitutional: Positive for activity change and unexpected weight change (gain). Negative for appetite change, fatigue and fever.   Eyes: Negative for photophobia and visual disturbance.   Respiratory: Negative for cough and shortness of breath.    Cardiovascular: Negative for chest pain and palpitations.   Gastrointestinal: Negative for abdominal distention, abdominal pain, constipation, diarrhea and nausea.   Endocrine: Negative for  "cold intolerance and heat intolerance.   Genitourinary: Negative for dyspareunia, dysuria, menstrual problem, pelvic pain, vaginal bleeding, vaginal discharge and vaginal pain.   Musculoskeletal: Negative for back pain.   Skin: Negative for color change and rash.   Neurological: Negative for headaches.   Hematological: Negative for adenopathy. Does not bruise/bleed easily.   Psychiatric/Behavioral: Negative for dysphoric mood and sleep disturbance. The patient is not nervous/anxious.        /72   Ht 172.7 cm (68\")   Wt 87.1 kg (192 lb)   Breastfeeding No   BMI 29.19 kg/m²     Physical Exam   Constitutional: She is oriented to person, place, and time. She appears well-developed.   HENT:   Head: Normocephalic and atraumatic.   Eyes: Conjunctivae are normal. No scleral icterus.   Neck: Neck supple. No thyromegaly present.   Cardiovascular: Normal rate, regular rhythm and normal heart sounds.   Pulmonary/Chest: Effort normal and breath sounds normal. Right breast exhibits no inverted nipple, no mass, no nipple discharge, no skin change and no tenderness. Left breast exhibits no inverted nipple, no mass, no nipple discharge, no skin change and no tenderness.   Abdominal: Soft. Normal appearance and bowel sounds are normal. She exhibits no distension and no mass. There is no abdominal tenderness. There is no rebound and no guarding. No hernia.   Genitourinary:    Pelvic exam was performed with patient supine.   There is no rash, tenderness, lesion or injury on the right labia. There is no rash, tenderness, lesion or injury on the left labia. Uterus is not deviated, not enlarged, not fixed and not tender. Cervix exhibits no motion tenderness, no discharge and no friability. Right adnexum displays no mass, no tenderness and no fullness. Left adnexum displays no mass, no tenderness and no fullness.    No vaginal discharge, erythema, tenderness or bleeding.   No erythema, tenderness or bleeding in the vagina.    No " foreign body in the vagina.      No signs of injury in the vagina.      Genitourinary Comments: Moderate atrophy noted     Neurological: She is alert and oriented to person, place, and time.   Skin: Skin is warm and dry.   Psychiatric: Her behavior is normal. Mood, judgment and thought content normal.   Nursing note and vitals reviewed.         Assessment/Plan    1) GYN HM: UTD 3/2018 nl pap,, check pap  SBE demonstrated and encouraged.  2) STD screening: declines Condoms encouraged.  3) Bone health - Weight bearing exercise, dietary calcium recommendations and vitamin D reviewed.   4) Diet and Exercise discussed  5) Smoking Status: No  6) Vaginal atrophy- Patient counseled that vaginal estrogen rings, creams and tablets are available and highly effective at treating local vaginal symptoms such as atrophy and vaginal dryness.  Vaginal estrogen does not cause uterine overgrowth and does not require a progestogen to protect the uterus.  Very small amounts of estrogen are absorbed systemically.  For patients with a history of an estrogen dependent cancer such as breast cancer, the decision to use local estrogen for local vaginal symptoms should be made after consultation with her oncologist.  Possible side effects include local irritation or burning and/or vaginal bleeding and should always be reported.  Pt declines treatment at present  7)MMG: UTD   8) DEXA- Had 4 doses of Prolia and is now on drug holiday. DEXA UTD 10/23387- osteoporosis, will restart Prolia 1/2021  9)C scope-UTD 11/2020- repeat 3 years  10) Family h/o DVT/PE- pt says she does not have any hereditary clotting disorder.   11)Parts of this document have been copied or forwarded from her previous visits and have been reviewed, updated and edited as indicated.   12)I saw the patient with a face mask, gloves and eye protection  The patient herself was masked.  Social distancing was observed as appropriate.  13)Follow up prn and 2 years annual         Diagnoses and all orders for this visit:    1. Pap smear, low-risk (Primary)  -     POC Urinalysis Dipstick  -     Pap IG (Image Guided)    2. Routine gynecological examination  -     POC Urinalysis Dipstick  -     Pap IG (Image Guided)    3. Special screening examination for human papillomavirus (HPV)  -     POC Urinalysis Dipstick  -     Pap IG (Image Guided)    4. Screening for condition  -     Mammo Screening Bilateral With CAD; Future  -     DEXA Bone Density Axial; Future    5. Encounter for screening mammogram for malignant neoplasm of breast   -     Mammo Screening Bilateral With CAD; Future    6. Asymptomatic menopausal state   -     DEXA Bone Density Axial; Future    7. Other osteoporosis without current pathological fracture    8. Vaginal atrophy        Magdalena Arteaga MD  12/28/2020  15:08 EST

## 2020-12-31 LAB
CYTOLOGIST CVX/VAG CYTO: NORMAL
CYTOLOGY CVX/VAG DOC CYTO: NORMAL
CYTOLOGY CVX/VAG DOC THIN PREP: NORMAL
DX ICD CODE: NORMAL
HIV 1 & 2 AB SER-IMP: NORMAL
Lab: NORMAL
OTHER STN SPEC: NORMAL
STAT OF ADQ CVX/VAG CYTO-IMP: NORMAL

## 2021-09-20 ENCOUNTER — HOSPITAL ENCOUNTER (OUTPATIENT)
Dept: MAMMOGRAPHY | Facility: HOSPITAL | Age: 71
End: 2021-09-20

## 2021-09-20 ENCOUNTER — APPOINTMENT (OUTPATIENT)
Dept: BONE DENSITY | Facility: HOSPITAL | Age: 71
End: 2021-09-20

## 2021-09-20 ENCOUNTER — HOSPITAL ENCOUNTER (OUTPATIENT)
Dept: MAMMOGRAPHY | Facility: HOSPITAL | Age: 71
Discharge: HOME | End: 2021-09-20

## 2021-09-20 DIAGNOSIS — Z12.31 ENCOUNTER FOR SCREENING MAMMOGRAM FOR MALIGNANT NEOPLASM OF BREAST: ICD-10-CM

## 2021-09-20 DIAGNOSIS — Z78.0 ASYMPTOMATIC MENOPAUSAL STATE: ICD-10-CM

## 2021-09-20 DIAGNOSIS — Z13.9 SCREENING FOR CONDITION: ICD-10-CM

## 2021-09-20 PROCEDURE — 77063 BREAST TOMOSYNTHESIS BI: CPT

## 2021-09-20 PROCEDURE — 77080 DXA BONE DENSITY AXIAL: CPT

## 2021-09-20 PROCEDURE — 77067 SCR MAMMO BI INCL CAD: CPT

## 2021-10-12 ENCOUNTER — TELEPHONE (OUTPATIENT)
Dept: OBSTETRICS AND GYNECOLOGY | Facility: CLINIC | Age: 71
End: 2021-10-12

## 2021-10-12 NOTE — TELEPHONE ENCOUNTER
Patient is available to get booster Covid vaccine and Flu shot, Patient wants to know if she should get the Prolia first and wait on the other or wait on the Prolia.

## 2022-07-06 ENCOUNTER — TRANSCRIBE ORDERS (OUTPATIENT)
Dept: MAMMOGRAPHY | Facility: HOSPITAL | Age: 72
End: 2022-07-06

## 2022-07-06 DIAGNOSIS — Z12.31 SCREENING MAMMOGRAM, ENCOUNTER FOR: Primary | ICD-10-CM

## 2022-11-08 ENCOUNTER — HOSPITAL ENCOUNTER (OUTPATIENT)
Dept: MAMMOGRAPHY | Facility: HOSPITAL | Age: 72
Discharge: HOME | End: 2022-11-08
Admitting: NURSE PRACTITIONER

## 2022-11-08 DIAGNOSIS — Z12.31 SCREENING MAMMOGRAM, ENCOUNTER FOR: ICD-10-CM

## 2022-11-08 PROCEDURE — 77067 SCR MAMMO BI INCL CAD: CPT

## 2022-11-08 PROCEDURE — 77063 BREAST TOMOSYNTHESIS BI: CPT

## 2023-10-12 ENCOUNTER — TRANSCRIBE ORDERS (OUTPATIENT)
Dept: ADMINISTRATIVE | Facility: HOSPITAL | Age: 73
End: 2023-10-12
Payer: MEDICARE

## 2023-10-12 DIAGNOSIS — Z12.31 VISIT FOR SCREENING MAMMOGRAM: Primary | ICD-10-CM

## 2023-12-04 ENCOUNTER — HOSPITAL ENCOUNTER (OUTPATIENT)
Dept: MAMMOGRAPHY | Facility: HOSPITAL | Age: 73
Discharge: HOME | End: 2023-12-04
Admitting: NURSE PRACTITIONER
Payer: MEDICARE

## 2023-12-04 DIAGNOSIS — Z12.31 VISIT FOR SCREENING MAMMOGRAM: ICD-10-CM

## 2023-12-04 PROCEDURE — 77063 BREAST TOMOSYNTHESIS BI: CPT

## 2023-12-04 PROCEDURE — 77067 SCR MAMMO BI INCL CAD: CPT

## 2024-10-31 ENCOUNTER — TRANSCRIBE ORDERS (OUTPATIENT)
Dept: ADMINISTRATIVE | Facility: HOSPITAL | Age: 74
End: 2024-10-31
Payer: MEDICARE

## 2024-10-31 DIAGNOSIS — Z12.31 BREAST CANCER SCREENING BY MAMMOGRAM: Primary | ICD-10-CM

## 2024-12-19 ENCOUNTER — OFFICE VISIT (OUTPATIENT)
Dept: OBSTETRICS AND GYNECOLOGY | Facility: CLINIC | Age: 74
End: 2024-12-19
Payer: MEDICARE

## 2024-12-19 VITALS
HEIGHT: 67 IN | WEIGHT: 171 LBS | BODY MASS INDEX: 26.84 KG/M2 | SYSTOLIC BLOOD PRESSURE: 120 MMHG | DIASTOLIC BLOOD PRESSURE: 78 MMHG

## 2024-12-19 DIAGNOSIS — Z78.0 ASYMPTOMATIC MENOPAUSE: ICD-10-CM

## 2024-12-19 DIAGNOSIS — Z01.419 PAP SMEAR, AS PART OF ROUTINE GYNECOLOGICAL EXAMINATION: Primary | ICD-10-CM

## 2024-12-19 DIAGNOSIS — Z01.419 ROUTINE GYNECOLOGICAL EXAMINATION: ICD-10-CM

## 2024-12-19 DIAGNOSIS — Z12.31 ENCOUNTER FOR SCREENING MAMMOGRAM FOR MALIGNANT NEOPLASM OF BREAST: ICD-10-CM

## 2024-12-19 LAB
BILIRUB BLD-MCNC: NEGATIVE MG/DL
CLARITY, POC: CLEAR
COLOR UR: YELLOW
GLUCOSE UR STRIP-MCNC: NEGATIVE MG/DL
KETONES UR QL: NEGATIVE
LEUKOCYTE EST, POC: NEGATIVE
NITRITE UR-MCNC: NEGATIVE MG/ML
PH UR: 5 [PH] (ref 5–8)
PROT UR STRIP-MCNC: NEGATIVE MG/DL
RBC # UR STRIP: NEGATIVE /UL
SP GR UR: 1.03 (ref 1–1.03)
UROBILINOGEN UR QL: NORMAL

## 2024-12-19 RX ORDER — FERROUS SULFATE 325(65) MG
325 TABLET ORAL DAILY
COMMUNITY

## 2024-12-19 NOTE — PROGRESS NOTES
GYN Annual Exam     CC- Here for annual exam.     Kerrie Polo is a 74 y.o. female previous patient not seen in the last three years who presents for annual well woman exam.  She was last seen in 2020.  She denies any postmenopausal vaginal bleeding or bladder issues.  She was on Prolia for about 2 to 3 years but none recently.  She is due for both mammogram and bone density now.  She has moderate atrophy on exam but declines vaginal estrogen.      OB History          2    Para   2    Term   2            AB        Living   2         SAB        IAB        Ectopic        Molar        Multiple        Live Births              Obstetric Comments   2                Menarche:18  Menopause:44  HRT:none  Current contraception: post menopausal status  History of abnormal Pap smear: no  History of abnormal mammogram: no  Family history of uterine, colon or ovarian cancer: yes - dad colon cancer > 50  Family history of breast cancer: yes - PGM ? age dx  STD's: none  Last pap smear:2020- nl pap  Gardasil: none  RAYSHAWN: family history and mom wiht DVT/PE, sister with DVT, MGM with DVT /PE- denies any clotting d/o history      Health Maintenance   Topic Date Due    Pneumococcal Vaccine 65+ (1 of 1 - PCV) Never done    HEPATITIS C SCREENING  Never done    ANNUAL WELLNESS VISIT  Never done    LIPID PANEL  2019    DXA SCAN  2023    INFLUENZA VACCINE  2024    COVID-19 Vaccine (2024- season) 2024    MAMMOGRAM  2025    COLORECTAL CANCER SCREENING  2026    TDAP/TD VACCINES (2 - Td or Tdap) 10/05/2029    ZOSTER VACCINE  Completed       Past Medical History:   Diagnosis Date    CAD (coronary artery disease)     GERD (gastroesophageal reflux disease) 2018    Hypercholesteremia 2018    Hypothyroidism 2018    MI (mitral incompetence)     MICHAEL (obstructive sleep apnea) 2019    Osteoporosis 2018       Past Surgical History:   Procedure Laterality Date     CARDIAC CATHETERIZATION      COLONOSCOPY      COLONOSCOPY N/A 11/25/2020    Procedure: COLONOSCOPY; POLYPECTOMY;  Surgeon: Ravinder Mccloud MD;  Location: [facility];  Service: Gastroenterology;  Laterality: N/A;  POLYPS  DIVERTICULOSIS    FOOT SURGERY           Current Outpatient Medications:     albuterol (PROVENTIL) (2.5 MG/3ML) 0.083% nebulizer solution, Inhale 2.5 mg., Disp: , Rfl:     aspirin 81 MG chewable tablet, Chew 81 mg Daily., Disp: , Rfl:     atorvastatin (LIPITOR) 20 MG tablet, Take 2 tablets by mouth., Disp: , Rfl:     denosumab (PROLIA) 60 MG/ML solution prefilled syringe syringe, Inject 1 mL under the skin into the appropriate area as directed 1 (One) Time for 1 dose., Disp: 1.8 mL, Rfl: 3    denosumab (PROLIA) 60 MG/ML solution prefilled syringe syringe, Inject 1 mL under the skin into the appropriate area as directed 1 (One) Time for 1 dose., Disp: 180 mL, Rfl: 1    ferrous sulfate 325 (65 FE) MG tablet, Take 1 tablet by mouth Daily., Disp: , Rfl:     fluticasone (FLONASE) 50 MCG/ACT nasal spray, 1 spray into the nostril(s) as directed by provider., Disp: , Rfl:     Fluzone High-Dose Quadrivalent 0.7 ML suspension prefilled syringe, , Disp: , Rfl:     levothyroxine (SYNTHROID, LEVOTHROID) 75 MCG tablet, , Disp: , Rfl:     montelukast (SINGULAIR) 10 MG tablet, Take 10 mg by mouth., Disp: , Rfl:     nitroglycerin (NITROSTAT) 0.4 MG SL tablet, Place 0.4 mg under the tongue., Disp: , Rfl:     omeprazole (priLOSEC) 20 MG capsule, Take 20 mg by mouth Daily., Disp: , Rfl:     Allergies   Allergen Reactions    Codeine Nausea And Vomiting       Social History     Tobacco Use    Smoking status: Never    Smokeless tobacco: Never   Vaping Use    Vaping status: Never Used   Substance Use Topics    Alcohol use: Yes     Comment: RARE    Drug use: No       Family History   Problem Relation Age of Onset    Breast cancer Paternal Grandmother         ? age at dx    Colon cancer Father         ? age     "Deep vein thrombosis Mother     Pulmonary embolism Mother     Deep vein thrombosis Sister     Deep vein thrombosis Maternal Grandmother     Ovarian cancer Neg Hx        Review of Systems   Constitutional:  Negative for activity change, appetite change, fatigue, fever and unexpected weight change.   Eyes:  Negative for photophobia and visual disturbance.   Respiratory:  Negative for cough and shortness of breath.    Cardiovascular:  Negative for chest pain and palpitations.   Gastrointestinal:  Negative for abdominal distention, abdominal pain, constipation, diarrhea and nausea.   Endocrine: Negative for cold intolerance and heat intolerance.   Genitourinary:  Negative for dyspareunia, dysuria, menstrual problem, pelvic pain, vaginal bleeding and vaginal discharge.   Musculoskeletal:  Negative for back pain.   Skin:  Negative for color change and rash.   Neurological:  Negative for headaches.   Hematological:  Negative for adenopathy. Does not bruise/bleed easily.   Psychiatric/Behavioral:  Negative for dysphoric mood. The patient is not nervous/anxious.        /78   Ht 170.2 cm (67\")   Wt 77.6 kg (171 lb)   BMI 26.78 kg/m²     Physical Exam  Vitals and nursing note reviewed. Exam conducted with a chaperone present.   Constitutional:       Appearance: Normal appearance. She is well-developed.   HENT:      Head: Normocephalic and atraumatic.   Eyes:      General: No scleral icterus.     Conjunctiva/sclera: Conjunctivae normal.   Neck:      Thyroid: No thyromegaly.   Cardiovascular:      Rate and Rhythm: Normal rate and regular rhythm.   Pulmonary:      Effort: Pulmonary effort is normal.      Breath sounds: Normal breath sounds.   Chest:   Breasts:     Right: No swelling, bleeding, inverted nipple, mass, nipple discharge, skin change or tenderness.      Left: No swelling, bleeding, inverted nipple, mass, nipple discharge, skin change or tenderness.   Abdominal:      General: Bowel sounds are normal. There is " no distension.      Palpations: Abdomen is soft. There is no mass.      Tenderness: There is no abdominal tenderness. There is no guarding or rebound.      Hernia: No hernia is present.   Genitourinary:     Exam position: Supine.      Labia:         Right: No rash, tenderness or lesion.         Left: No rash, tenderness or lesion.       Urethra: No prolapse, urethral pain, urethral swelling or urethral lesion.      Vagina: No signs of injury and foreign body. No vaginal discharge, erythema, tenderness or bleeding.      Cervix: No cervical motion tenderness, discharge or friability.      Uterus: Not deviated, not enlarged, not fixed and not tender.       Adnexa:         Right: No mass, tenderness or fullness.          Left: No mass, tenderness or fullness.        Rectum: Normal.      Comments: Moderate atrophy  Musculoskeletal:      Cervical back: Neck supple.   Skin:     General: Skin is warm and dry.   Neurological:      Mental Status: She is alert and oriented to person, place, and time.   Psychiatric:         Behavior: Behavior normal.         Thought Content: Thought content normal.         Judgment: Judgment normal.            Assessment/Plan    1) GYN HM: pap  SBE demonstrated and encouraged. If normal, last pap  2) STD screening: declines Condoms encouraged.  3) Bone health - Weight bearing exercise, dietary calcium recommendations and vitamin D reviewed.   4) Diet and Exercise discussed  5) Smoking Status: No  6) Social: still working, kids live in [place] and [place]  7)MMG: UTD 12/2023 B1. Schedule MMG now  8) DEXA-UTD 9/2021- osteoporosis, no longer on Prolia. Check DEXA now  9)C scope-UTD 11/2023 in [facility] per pt. Repeat in 3 years  10) Follow up prn and 2 years annual        Diagnoses and all orders for this visit:    1. Pap smear, as part of routine gynecological examination (Primary)  -     Pap IG (Image Guided)    2. Routine gynecological examination  -     POC Urinalysis Dipstick  -     Pap  IG (Image Guided)    3. Asymptomatic menopause  -     DEXA Bone Density Axial; Future    4. Encounter for screening mammogram for malignant neoplasm of breast  -     Mammo Screening Digital Tomosynthesis Bilateral With CAD; Future        Magdalena Leyda Arteaga MD  12/19/2024    10:35 EST

## 2024-12-29 LAB
CYTOLOGIST CVX/VAG CYTO: NORMAL
CYTOLOGY CVX/VAG DOC CYTO: NORMAL
CYTOLOGY CVX/VAG DOC THIN PREP: NORMAL
DX ICD CODE: NORMAL
Lab: NORMAL
OTHER STN SPEC: NORMAL
STAT OF ADQ CVX/VAG CYTO-IMP: NORMAL

## 2024-12-30 ENCOUNTER — HOSPITAL ENCOUNTER (OUTPATIENT)
Dept: MAMMOGRAPHY | Facility: HOSPITAL | Age: 74
Discharge: HOME | End: 2024-12-30
Admitting: NURSE PRACTITIONER
Payer: MEDICARE

## 2024-12-30 DIAGNOSIS — Z12.31 BREAST CANCER SCREENING BY MAMMOGRAM: ICD-10-CM

## 2024-12-30 PROCEDURE — 77063 BREAST TOMOSYNTHESIS BI: CPT | Performed by: RADIOLOGY

## 2024-12-30 PROCEDURE — 77067 SCR MAMMO BI INCL CAD: CPT

## 2024-12-30 PROCEDURE — 77063 BREAST TOMOSYNTHESIS BI: CPT

## 2024-12-30 PROCEDURE — 77067 SCR MAMMO BI INCL CAD: CPT | Performed by: RADIOLOGY

## 2025-07-15 ENCOUNTER — TELEPHONE (OUTPATIENT)
Dept: OBSTETRICS AND GYNECOLOGY | Facility: CLINIC | Age: 75
End: 2025-07-15

## 2025-07-30 ENCOUNTER — TRANSCRIBE ORDERS (OUTPATIENT)
Dept: OBSTETRICS AND GYNECOLOGY | Facility: CLINIC | Age: 75
End: 2025-07-30
Payer: MEDICARE

## 2025-07-30 DIAGNOSIS — Z12.31 SCREENING MAMMOGRAM, ENCOUNTER FOR: Primary | ICD-10-CM

## 2025-08-06 ENCOUNTER — APPOINTMENT (OUTPATIENT)
Dept: BONE DENSITY | Facility: HOSPITAL | Age: 75
End: 2025-08-06
Payer: MEDICARE

## 2025-08-06 DIAGNOSIS — Z78.0 ASYMPTOMATIC MENOPAUSE: ICD-10-CM

## 2025-08-06 PROCEDURE — 77080 DXA BONE DENSITY AXIAL: CPT

## 2025-08-11 ENCOUNTER — TELEPHONE (OUTPATIENT)
Dept: OBSTETRICS AND GYNECOLOGY | Facility: CLINIC | Age: 75
End: 2025-08-11
Payer: MEDICARE

## (undated) DEVICE — KT ORCA ORCAPOD DISP STRL

## (undated) DEVICE — GLV SURG SENSICARE PI MIC PF SZ7.5 LF STRL

## (undated) DEVICE — SYR LL 3CC

## (undated) DEVICE — JACKT LAB F/R KNIT CUFF/COLR XLG BLU

## (undated) DEVICE — VIAL FORMALIN CAP 10P 40ML

## (undated) DEVICE — FRCP BX RADJAW4 NDL 2.8 240CM LG OG BX40

## (undated) DEVICE — Device

## (undated) DEVICE — SUCTION CANISTER, 3000CC,SAFELINER: Brand: DEROYAL

## (undated) DEVICE — SPNG GZ WOVN 4X4IN 12PLY 10/BX STRL

## (undated) DEVICE — BW-412T DISP COMBO CLEANING BRUSH: Brand: SINGLE USE COMBINATION CLEANING BRUSH